# Patient Record
Sex: MALE | Race: OTHER | Employment: FULL TIME | URBAN - METROPOLITAN AREA
[De-identification: names, ages, dates, MRNs, and addresses within clinical notes are randomized per-mention and may not be internally consistent; named-entity substitution may affect disease eponyms.]

---

## 2020-03-31 ENCOUNTER — HOSPITAL ENCOUNTER (EMERGENCY)
Age: 30
Discharge: HOME | End: 2020-03-31
Attending: EMERGENCY MEDICINE
Payer: SELF-PAY

## 2020-03-31 ENCOUNTER — APPOINTMENT (OUTPATIENT)
Dept: GENERAL RADIOLOGY | Age: 30
End: 2020-03-31
Attending: EMERGENCY MEDICINE
Payer: SELF-PAY

## 2020-03-31 VITALS
DIASTOLIC BLOOD PRESSURE: 75 MMHG | OXYGEN SATURATION: 98 % | BODY MASS INDEX: 24.29 KG/M2 | SYSTOLIC BLOOD PRESSURE: 121 MMHG | RESPIRATION RATE: 18 BRPM | WEIGHT: 132 LBS | HEART RATE: 62 BPM | HEIGHT: 62 IN | TEMPERATURE: 98.7 F

## 2020-03-31 DIAGNOSIS — J06.9 UPPER RESPIRATORY TRACT INFECTION, UNSPECIFIED TYPE: Primary | ICD-10-CM

## 2020-03-31 PROCEDURE — 71046 X-RAY EXAM CHEST 2 VIEWS: CPT

## 2020-03-31 PROCEDURE — 99284 EMERGENCY DEPT VISIT MOD MDM: CPT

## 2020-03-31 RX ORDER — BROMPHENIRAMINE MALEATE, PSEUDOEPHEDRINE HYDROCHLORIDE, AND DEXTROMETHORPHAN HYDROBROMIDE 2; 30; 10 MG/5ML; MG/5ML; MG/5ML
5 SYRUP ORAL
Qty: 200 ML | Refills: 0 | Status: SHIPPED | OUTPATIENT
Start: 2020-03-31

## 2020-04-01 ENCOUNTER — PATIENT OUTREACH (OUTPATIENT)
Dept: CASE MANAGEMENT | Age: 30
End: 2020-04-01

## 2020-04-01 NOTE — DISCHARGE INSTRUCTIONS
Patient Education        Infección de las vías respiratorias altas (Bernestine Pean): Instrucciones de cuidado  Upper Respiratory Infection (Cold): Care Instructions  Instrucciones de cuidado    La infección de las vías respiratorias altas (o URI, por omar siglas en inglés), es tamy infección de la [place], los senos paranasales o la garganta. Las URI se transmiten por la tos, los estornudos y el contacto directo. El resfriado común es el tipo más frecuente de URI. La gripe y las infecciones de los senos paranasales son otros tipos de URI. Grace todas las URI son causadas por virus. Los antibióticos no las Nany H[place]. Sin embargo, usted puede tratar la mayoría de estas infecciones con cuidados en el hogar. [place] puede implicar beber muchos líquidos y jessa analgésicos (medicamentos para el dolor) de venta trinh. Es probable que se sienta mejor al cabo de 4 a 10 días. El médico lo modi revisado minuciosamente, marii se pueden presentar problemas más tarde. Si nota algún problema o nuevos síntomas, busque tratamiento médico inmediatamente. La atención de seguimiento es tamy parte clave de mueller tratamiento y seguridad. Asegúrese de hacer y acudir a todas las citas, y llame a mueller médico si está teniendo problemas. También es tamy buena idea saber los resultados de omar exámenes y mantener tamy lista de los medicamentos que sylvain. ¿Cómo puede cuidarse en el hogar? · Para prevenir la deshidratación, ana cristina abundantes líquidos, los suficientes alan para que mueller orina sea de color amarillo silvia o transparente alan el agua. Opte por beber agua y otros líquidos summer sin cafeína hasta que se sienta mejor. Si tiene [facility] [employer], del corazón o del hígado y tiene que [place] líquidos, hable con mueller médico antes de aumentar mueller consumo. · Urich un analgésico de venta trinh, alan acetaminofén (Tylenol), ibuprofeno (Advil, Motrin) o naproxeno (Aleve). Marcelle y siga todas las instrucciones de la [place].   · Antes de usar medicamentos para la tos y los resfriados, revise la [place]. Estos medicamentos podrían no ser seguros para los niños pequeños o las personas con ciertos problemas de [place]. · Tenga cuidado cuando tome medicamentos de venta trinh para el resfriado común o la gripe y Tylenol al [employer]. Muchos de estos medicamentos contienen acetaminofén, o sea, Tylenol. Marcelle las etiquetas para asegurarse de que no está tomando tamy dosis mayor que la recomendada. El exceso de acetaminofén (Tylenol) puede ser dañino. · Descanse lo suficiente. · No fume ni permita que otros fumen cerca de usted. Si necesita ayuda para dejar de fumar, hable con mueller médico acerca de programas y medicamentos para dejar de fumar. Estos pueden aumentar omar probabilidades de dejar el hábito para siempre. ¿Cuándo debe pedir ayuda? Llame al 911 en cualquier momento que considere que necesita atención de [place]. Por ejemplo, llame si:    · Tiene graves dificultades para respirar.    Llame a mueller médico ahora mismo o busque atención médica inmediata si:    · Le parece que está mucho más enfermo.     · Tiene nueva o peor dificultad para respirar.     · Tiene fiebre nueva o más bianka.     · Tiene un salpullido nuevo.    Preste especial atención a los cambios en mueller davin y asegúrese de comunicarse con mueller médico si:    · Tiene síntomas nuevos, alan dolor de garganta, dolor de oídos o dolor de los senos paranasales.     · Mueller tos es más profunda o más frecuente que antes, especialmente si nota más mucosidad o un cambio en el color de la mucosidad.     · No mejora alan se esperaba. ¿Dónde puede encontrar más información en inglés? Vaya a http://florin-viridiana.info/  Kimmie [id number] en la búsqueda para aprender más acerca de \"Infección de las vías respiratorias altas (Tim [place]ible[place]): Instrucciones de cuidado. \"  Revisado: 9 junio, 2019Versión del contenido: 12.4  © 1667-9363 Healthwise, Incorporated.   Las instrucciones de cuidado fueron adaptadas bajo licencia por [facility] (which disclaims liability or warranty for this information). Si usted tiene [place] afección médica o sobre estas instrucciones, siempre pregunte a mueller profesional de davin. [facility] niega toda garantía o responsabilidad por mueller uso de esta información.

## 2020-04-01 NOTE — ED TRIAGE NOTES
Arrives with face mask in place, placed by EMS pta. Reports non-productive cough. Onset last night. Denies fever/chills, shortness of breath, n/v/d, abdominal pain. Denies recent travel or sick exposure. Attempted otc meds without relief.

## 2020-04-01 NOTE — ED PROVIDER NOTES
Arrives with face mask in place, placed by EMS pta. Reports non-productive cough. Onset last night. Denies fever/chills, shortness of breath, n/v/d, abdominal pain. Denies recent travel or sick exposure. Attempted otc meds without relief. The history is provided by the patient. Cough   This is a new problem. The current episode started 12 to 24 hours ago. The problem occurs constantly. The problem has not changed since onset. The cough is non-productive. There has been no fever. Associated symptoms include rhinorrhea and sore throat. Pertinent negatives include no chest pain, no chills, no sweats, no weight loss, no eye redness, no ear congestion, no ear pain, no headaches, no myalgias, no shortness of breath, no wheezing, no nausea, no vomiting and no confusion. The treatment provided no relief. Risk factors include travel to endemic areas. He is not a smoker. No past medical history on file. No past surgical history on file. No family history on file.     Social History     Socioeconomic History    Marital status: SINGLE     Spouse name: Not on file    Number of children: Not on file    Years of education: Not on file    Highest education level: Not on file   Occupational History    Not on file   Social Needs    Financial resource strain: Not on file    Food insecurity     Worry: Not on file     Inability: Not on file    Transportation needs     Medical: Not on file     Non-medical: Not on file   Tobacco Use    Smoking status: Not on file   Substance and Sexual Activity    Alcohol use: Not on file    Drug use: Not on file    Sexual activity: Not on file   Lifestyle    Physical activity     Days per week: Not on file     Minutes per session: Not on file    Stress: Not on file   Relationships    Social connections     Talks on phone: Not on file     Gets together: Not on file     Attends Muslim service: Not on file     Active member of club or organization: Not on file Attends meetings of clubs or organizations: Not on file     Relationship status: Not on file    Intimate partner violence     Fear of current or ex partner: Not on file     Emotionally abused: Not on file     Physically abused: Not on file     Forced sexual activity: Not on file   Other Topics Concern    Not on file   Social History Narrative    Not on file         ALLERGIES: Patient has no known allergies. Review of Systems   Constitutional: Negative for chills and weight loss. HENT: Positive for rhinorrhea and sore throat. Negative for ear pain. Eyes: Negative for redness. Respiratory: Positive for cough. Negative for shortness of breath and wheezing. Cardiovascular: Negative for chest pain. Gastrointestinal: Negative for nausea and vomiting. Musculoskeletal: Negative for myalgias. Neurological: Negative for headaches. Psychiatric/Behavioral: Negative for confusion. All other systems reviewed and are negative. Vitals:    03/31/20 2041   BP: 135/77   Pulse: 68   Resp: 18   Temp: 98.7 °F (37.1 °C)   SpO2: 98%   Weight: 59.9 kg (132 lb)   Height: 5' 2\" (1.575 m)            Physical Exam  Vitals signs and nursing note reviewed. Constitutional:       General: He is not in acute distress. Appearance: Normal appearance. He is not ill-appearing, toxic-appearing or diaphoretic. HENT:      Head: Normocephalic and atraumatic. Right Ear: Tympanic membrane normal.      Left Ear: Tympanic membrane normal.      Nose: Congestion present. No rhinorrhea. Mouth/Throat:      Mouth: Mucous membranes are moist.      Pharynx: Oropharynx is clear. No oropharyngeal exudate or posterior oropharyngeal erythema. Eyes:      Extraocular Movements: Extraocular movements intact. Conjunctiva/sclera: Conjunctivae normal.      Pupils: Pupils are equal, round, and reactive to light. Neck:      Musculoskeletal: Normal range of motion and neck supple.    Cardiovascular:      Rate and Rhythm: Normal rate and regular rhythm. Pulses: Normal pulses. Heart sounds: Normal heart sounds. Pulmonary:      Effort: Pulmonary effort is normal.      Breath sounds: Normal breath sounds. Musculoskeletal: Normal range of motion. Lymphadenopathy:      Cervical: No cervical adenopathy. Skin:     General: Skin is warm and dry. Capillary Refill: Capillary refill takes less than 2 seconds. Neurological:      General: No focal deficit present. Mental Status: He is alert and oriented to person, place, and time. Mental status is at baseline. Psychiatric:         Mood and Affect: Mood normal.         Behavior: Behavior normal.         Thought Content:  Thought content normal.          MDM  Number of Diagnoses or Management Options     Amount and/or Complexity of Data Reviewed  Tests in the radiology section of CPT®: ordered and reviewed    Risk of Complications, Morbidity, and/or Mortality  Presenting problems: low  Diagnostic procedures: low  Management options: low    Patient Progress  Patient progress: stable         Procedures

## 2020-04-01 NOTE — PROGRESS NOTES
Patient contacted regarding recent discharge and COVID-19 risk   Care Transition Nurse/ Ambulatory Care Manager contacted the patient by telephone to perform post discharge assessment. Verified name and  with patient as identifiers. Patient has following risk factors of: None. CTN/ACM reviewed discharge instructions, medical action plan and red flags related to discharge diagnosis. Reviewed and educated them on any new and changed medications related to discharge diagnosis. Advised obtaining a 90-day supply of all daily and as-needed medications. Education provided regarding infection prevention, and signs and symptoms of COVID-19 and when to seek medical attention with patient who verbalized understanding. Discussed exposure protocols and quarantine from [address] you at higher risk for severe illness  and given an opportunity for questions and concerns. The patient agrees to contact the COVID-19 hotline [phone] or PCP office for questions related to their healthcare. CTN/ACM provided contact information for future reference. From CDC: Are you at higher risk for severe illness?  Wash your hands often.  Avoid close contact (6 feet, which is about two arm lengths) with people who are sick.  Put distance between yourself and other people if COVID-19 is spreading in your community.  Clean and disinfect frequently touched surfaces.  Avoid all cruise travel and non-essential air travel.  Call your healthcare professional if you have concerns about COVID-19 and your underlying condition or if you are sick. For more information on steps you can take to protect yourself, see CDC's How to Protect Yourself       line assisted w/ call. Pt is Frisian speaker only. Pt reports his cough and sore throat are better. Denies any other symptoms. Plans to  [facility] RX today. Pt does not have a PCP.  Informed patient of [facility] or [facility] for f/u, go to ED if emergency. Plan to f/u next week, agreeable.

## 2020-04-01 NOTE — ED NOTES
I have reviewed discharge instructions with the patient. The patient verbalized understanding. Patient left ED via Discharge Method: ambulatory to Home with (insert name of family/friend, self, transport family). Opportunity for questions and clarification provided. Patient given 2 scripts. To continue your aftercare when you leave the hospital, you may receive an automated call from our care team to check in on how you are doing.  This is a free service and part of our promise to provide the best care and service to meet your aftercare needs. \" If you have questions, or wish to unsubscribe from this service please call [phone].  Thank you for Choosing our [facility] Emergency Department.

## 2020-04-09 ENCOUNTER — PATIENT OUTREACH (OUTPATIENT)
Dept: CASE MANAGEMENT | Age: 30
End: 2020-04-09

## 2022-02-08 ENCOUNTER — HOSPITAL ENCOUNTER (EMERGENCY)
Age: 32
Discharge: HOME | End: 2022-02-09
Attending: EMERGENCY MEDICINE

## 2022-02-08 VITALS
DIASTOLIC BLOOD PRESSURE: 72 MMHG | RESPIRATION RATE: 16 BRPM | HEART RATE: 59 BPM | SYSTOLIC BLOOD PRESSURE: 111 MMHG | BODY MASS INDEX: 27.6 KG/M2 | WEIGHT: 150 LBS | HEIGHT: 62 IN | TEMPERATURE: 98.4 F | OXYGEN SATURATION: 99 %

## 2022-02-08 DIAGNOSIS — K29.00 ACUTE GASTRITIS WITHOUT HEMORRHAGE, UNSPECIFIED GASTRITIS TYPE: Primary | ICD-10-CM

## 2022-02-08 LAB
ALBUMIN SERPL-MCNC: 4 G/DL (ref 3.5–5)
ALBUMIN/GLOB SERPL: 1.2 {RATIO} (ref 1.2–3.5)
ALP SERPL-CCNC: 85 U/L (ref 50–136)
ALT SERPL-CCNC: 50 U/L (ref 12–65)
ANION GAP SERPL CALC-SCNC: 5 MMOL/L (ref 7–16)
AST SERPL-CCNC: 19 U/L (ref 15–37)
BASOPHILS # BLD: 0 K/UL (ref 0–0.2)
BASOPHILS NFR BLD: 0 % (ref 0–2)
BILIRUB SERPL-MCNC: 0.3 MG/DL (ref 0.2–1.1)
BILIRUB UR QL: NEGATIVE
BUN SERPL-MCNC: 16 MG/DL (ref 6–23)
CALCIUM SERPL-MCNC: 9.1 MG/DL (ref 8.3–10.4)
CHLORIDE SERPL-SCNC: 105 MMOL/L (ref 98–107)
CO2 SERPL-SCNC: 29 MMOL/L (ref 21–32)
CREAT SERPL-MCNC: 0.7 MG/DL (ref 0.8–1.5)
DIFFERENTIAL METHOD BLD: ABNORMAL
EOSINOPHIL # BLD: 0 K/UL (ref 0–0.8)
EOSINOPHIL NFR BLD: 0 % (ref 0.5–7.8)
ERYTHROCYTE [DISTWIDTH] IN BLOOD BY AUTOMATED COUNT: 13.2 % (ref 11.9–14.6)
GLOBULIN SER CALC-MCNC: 3.3 G/DL (ref 2.3–3.5)
GLUCOSE SERPL-MCNC: 109 MG/DL (ref 65–100)
GLUCOSE UR QL STRIP.AUTO: NEGATIVE MG/DL
HCT VFR BLD AUTO: 42.3 % (ref 41.1–50.3)
HGB BLD-MCNC: 14.1 G/DL (ref 13.6–17.2)
IMM GRANULOCYTES # BLD AUTO: 0.2 K/UL (ref 0–0.5)
IMM GRANULOCYTES NFR BLD AUTO: 1 % (ref 0–5)
KETONES UR-MCNC: NEGATIVE MG/DL
LEUKOCYTE ESTERASE UR QL STRIP: NEGATIVE
LIPASE SERPL-CCNC: 70 U/L (ref 73–393)
LYMPHOCYTES # BLD: 3.2 K/UL (ref 0.5–4.6)
LYMPHOCYTES NFR BLD: 24 % (ref 13–44)
MCH RBC QN AUTO: 29.3 PG (ref 26.1–32.9)
MCHC RBC AUTO-ENTMCNC: 33.3 G/DL (ref 31.4–35)
MCV RBC AUTO: 87.8 FL (ref 79.6–97.8)
MONOCYTES # BLD: 0.8 K/UL (ref 0.1–1.3)
MONOCYTES NFR BLD: 6 % (ref 4–12)
NEUTS SEG # BLD: 9.2 K/UL (ref 1.7–8.2)
NEUTS SEG NFR BLD: 69 % (ref 43–78)
NITRITE UR QL: NEGATIVE
NRBC # BLD: 0 K/UL (ref 0–0.2)
PH UR: 7 [PH] (ref 5–9)
PLATELET # BLD AUTO: 307 K/UL (ref 150–450)
PMV BLD AUTO: 10.7 FL (ref 9.4–12.3)
POTASSIUM SERPL-SCNC: 4.1 MMOL/L (ref 3.5–5.1)
PROT SERPL-MCNC: 7.3 G/DL (ref 6.3–8.2)
PROT UR QL: NEGATIVE MG/DL
RBC # BLD AUTO: 4.82 M/UL (ref 4.23–5.6)
RBC # UR STRIP: NEGATIVE /UL
SERVICE CMNT-IMP: ABNORMAL
SODIUM SERPL-SCNC: 139 MMOL/L (ref 136–145)
SP GR UR: >1.03 (ref 1–1.02)
UROBILINOGEN UR QL: 0.2 EU/DL (ref 0.2–1)
WBC # BLD AUTO: 13.4 K/UL (ref 4.3–11.1)

## 2022-02-08 PROCEDURE — 80053 COMPREHEN METABOLIC PANEL: CPT

## 2022-02-08 PROCEDURE — 81003 URINALYSIS AUTO W/O SCOPE: CPT

## 2022-02-08 PROCEDURE — 85025 COMPLETE CBC W/AUTO DIFF WBC: CPT

## 2022-02-08 PROCEDURE — 99284 EMERGENCY DEPT VISIT MOD MDM: CPT

## 2022-02-08 PROCEDURE — 83690 ASSAY OF LIPASE: CPT

## 2022-02-08 RX ORDER — SODIUM CHLORIDE 0.9 % (FLUSH) 0.9 %
5-10 SYRINGE (ML) INJECTION EVERY 8 HOURS
Status: DISCONTINUED | OUTPATIENT
Start: 2022-02-08 | End: 2022-02-09 | Stop reason: HOSPADM

## 2022-02-08 RX ORDER — SODIUM CHLORIDE 0.9 % (FLUSH) 0.9 %
5-10 SYRINGE (ML) INJECTION AS NEEDED
Status: DISCONTINUED | OUTPATIENT
Start: 2022-02-08 | End: 2022-02-09 | Stop reason: HOSPADM

## 2022-02-09 PROCEDURE — 74011000250 HC RX REV CODE- 250: Performed by: EMERGENCY MEDICINE

## 2022-02-09 PROCEDURE — 74011250637 HC RX REV CODE- 250/637

## 2022-02-09 RX ORDER — MAG HYDROX/ALUMINUM HYD/SIMETH 200-200-20
SUSPENSION, ORAL (FINAL DOSE FORM) ORAL
Status: COMPLETED
Start: 2022-02-09 | End: 2022-02-09

## 2022-02-09 RX ORDER — HYDROGEN PEROXIDE 3 %
20 SOLUTION, NON-ORAL MISCELLANEOUS DAILY
Qty: 20 CAPSULE | Refills: 0 | Status: SHIPPED | OUTPATIENT
Start: 2022-02-09 | End: 2022-03-01

## 2022-02-09 RX ORDER — MAG HYDROX/ALUMINUM HYD/SIMETH 200-200-20
30 SUSPENSION, ORAL (FINAL DOSE FORM) ORAL
Status: COMPLETED | OUTPATIENT
Start: 2022-02-09 | End: 2022-02-09

## 2022-02-09 RX ORDER — LIDOCAINE HYDROCHLORIDE 20 MG/ML
10 SOLUTION OROPHARYNGEAL ONCE
Status: COMPLETED | OUTPATIENT
Start: 2022-02-09 | End: 2022-02-09

## 2022-02-09 RX ADMIN — ALUMINUM HYDROXIDE, MAGNESIUM HYDROXIDE, DIMETHICONE 30 ML: 200; 200; 20 LIQUID ORAL at 02:30

## 2022-02-09 RX ADMIN — Medication 30 ML: at 02:30

## 2022-02-09 RX ADMIN — LIDOCAINE HYDROCHLORIDE 10 ML: 20 SOLUTION ORAL; TOPICAL at 02:25

## 2022-02-09 NOTE — ED TRIAGE NOTES
Pt states that he began having abd pain 2 weeks ago. States this morning the pain got a lot worse and he also began having back pain.

## 2022-02-09 NOTE — ED NOTES
I have reviewed discharge instructions with the patient. The patient verbalized understanding. Patient left ED via Discharge Method: ambulatory to Home with female . Opportunity for questions and clarification provided. Patient given 1 scripts. To continue your aftercare when you leave the hospital, you may receive an automated call from our care team to check in on how you are doing. This is a free service and part of our promise to provide the best care and service to meet your aftercare needs.  If you have questions, or wish to unsubscribe from this service please call [phone]. Thank you for Choosing our [facility] Emergency Department.

## 2022-02-09 NOTE — ED PROVIDER NOTES
57-year-old male presents with 2 to 3 weeks of upper abdominal cramping rating to his chest and back. He states he gets it about 2-3 times a day. Pain is worse when he does not eat. He reports one episode of vomiting yesterday and then ate an orange because he was hungry. He denies diarrhea. No blood in vomit or stools. No fever. No prior abdominal surgeries. Denies alcohol use. He stopped eating spicy foods 2 weeks ago when pain started. Abdominal Pain   Associated symptoms include nausea, vomiting and back pain. Pertinent negatives include no fever, no diarrhea, no headaches and no chest pain. Back Pain   Associated symptoms include abdominal pain. Pertinent negatives include no chest pain, no fever and no headaches. No past medical history on file. No past surgical history on file. No family history on file. Social History     Socioeconomic History    Marital status: SINGLE     Spouse name: Not on file    Number of children: Not on file    Years of education: Not on file    Highest education level: Not on file   Occupational History    Not on file   Tobacco Use    Smoking status: Not on file    Smokeless tobacco: Not on file   Substance and Sexual Activity    Alcohol use: Not on file    Drug use: Not on file    Sexual activity: Not on file   Other Topics Concern    Not on file   Social History Narrative    Not on file     Social Determinants of Health     Financial Resource Strain:     Difficulty of Paying Living Expenses: Not on file   Food Insecurity:     Worried About Running Out of Food in the Last Year: Not on file    Marjorie of Food in the Last Year: Not on file   Transportation Needs:     Lack of Transportation (Medical): Not on file    Lack of Transportation (Non-Medical):  Not on file   Physical Activity:     Days of Exercise per Week: Not on file    Minutes of Exercise per Session: Not on file   Stress:     Feeling of Stress : Not on file   Social Connections:     Frequency of Communication with Friends and Family: Not on file    Frequency of Social Gatherings with Friends and Family: Not on file    Attends [employer] Services: Not on file    Active Member of Clubs or Organizations: Not on file    Attends Club or Organization Meetings: Not on file    Marital Status: Not on file   Intimate Partner Violence:     Fear of Current or Ex-Partner: Not on file    Emotionally Abused: Not on file    Physically Abused: Not on file    Sexually Abused: Not on file   Housing Stability:     Unable to Pay for Housing in the Last Year: Not on file    Number of [place] in the Last Year: Not on file    Unstable Housing in the Last Year: Not on file         ALLERGIES: Patient has no known allergies. Review of Systems   Constitutional: Negative for fever. HENT: Negative for hearing loss. Eyes: Negative for visual disturbance. Respiratory: Negative for cough and shortness of breath. Cardiovascular: Negative for chest pain. Gastrointestinal: Positive for abdominal pain, nausea and vomiting. Negative for diarrhea. Musculoskeletal: Positive for back pain. Skin: Negative for rash. Neurological: Negative for headaches. Psychiatric/Behavioral: Negative for confusion. All other systems reviewed and are negative. Vitals:    02/08/22 2019 02/08/22 2237   BP: 121/69 111/72   Pulse: 62 (!) 59   Resp: 16    Temp: 98.4 °F (36.9 °C)    SpO2: 97% 99%   Weight: 68 kg (150 lb)    Height: 5' 2\" (1.575 m)             Physical Exam  Vitals and nursing note reviewed. Constitutional:       Appearance: Normal appearance. He is well-developed. HENT:      Head: Normocephalic and atraumatic. Nose: Nose normal.      Mouth/Throat:      Mouth: Mucous membranes are moist.   Eyes:      Pupils: Pupils are equal, round, and reactive to light. Cardiovascular:      Rate and Rhythm: Regular rhythm. Heart sounds: Normal heart sounds.    Pulmonary: Effort: Pulmonary effort is normal.      Breath sounds: Normal breath sounds. Abdominal:      Palpations: Abdomen is soft. Tenderness: There is abdominal tenderness in the epigastric area. Musculoskeletal:         General: No deformity. Normal range of motion. Cervical back: Normal range of motion and neck supple. Skin:     General: Skin is warm and dry. Neurological:      General: No focal deficit present. Mental Status: He is alert. Mental status is at baseline. Psychiatric:         Mood and Affect: Mood normal.         Behavior: Behavior normal.          MDM  Number of Diagnoses or Management Options  Acute gastritis without hemorrhage, unspecified gastritis type  Diagnosis management comments: Parts of this document were created using dragon voice recognition software. The chart has been reviewed but errors may still be present. I wore appropriate PPE throughout this patient's ED visit. Michael Braun MD, 2:31 AM    Well-appearing. Likely gastritis/peptic ulcer disease/GERD. Normal LFTs and lipase. Will prescribe PPI. Advise primary care follow-up after 1 week. I discussed the results of all labs, procedures, radiographs, and treatments with the patient and available family. Treatment plan is agreed upon and the patient is ready for discharge. Questions about treatment in the ED and differential diagnosis of presenting condition were answered. Patient was given verbal discharge instructions including, but not limited to, importance of returning to the emergency department for any concern of worsening or continued symptoms. Instructions were given to follow up with a primary care provider or specialist within 1-2 days. Adverse effects of medications, if prescribed, were discussed and patient was advised to refrain from significant physical activity until followed up by primary care physician and to not drive or operate heavy machinery after taking any sedating substances. Amount and/or Complexity of Data Reviewed  Clinical lab tests: reviewed and ordered (Results for orders placed or performed during the hospital encounter of 02/08/22  -CBC WITH AUTOMATED DIFF:        Result                      Value             Ref Range           WBC                         13.4 (H)          4.3 - 11.1 K*       RBC                         4.82              4.23 - 5.6 M*       HGB                         14.1              13.6 - 17.2 *       HCT                         42.3              41.1 - 50.3 %       MCV                         87.8              79.6 - 97.8 *       MCH                         29.3              26.1 - 32.9 *       MCHC                        33.3              31.4 - 35.0 *       RDW                         13.2              11.9 - 14.6 %       PLATELET                    307               150 - 450 K/*       MPV                         10.7              9.4 - 12.3 FL       ABSOLUTE NRBC               0.00              0.0 - 0.2 K/*       DF                          AUTOMATED                             NEUTROPHILS                 69                43 - 78 %           LYMPHOCYTES                 24                13 - 44 %           MONOCYTES                   6                 4.0 - 12.0 %        EOSINOPHILS                 0 (L)             0.5 - 7.8 %         BASOPHILS                   0                 0.0 - 2.0 %         IMMATURE GRANULOCYTES       1                 0.0 - 5.0 %         ABS. NEUTROPHILS            9.2 (H)           1.7 - 8.2 K/*       ABS. LYMPHOCYTES            3.2               0.5 - 4.6 K/*       ABS. MONOCYTES              0.8               0.1 - 1.3 K/*       ABS. EOSINOPHILS            0.0               0.0 - 0.8 K/*       ABS. BASOPHILS              0.0               0.0 - 0.2 K/*       ABS. IMM.  GRANS.            0.2               0.0 - 0.5 K/*  -METABOLIC PANEL, COMPREHENSIVE:        Result                      Value             Ref Range Sodium                      139               136 - 145 mm*       Potassium                   4.1               3.5 - 5.1 mm*       Chloride                    105               98 - 107 mmo*       CO2                         29                21 - 32 mmol*       Anion gap                   5 (L)             7 - 16 mmol/L       Glucose                     109 (H)           65 - 100 mg/*       BUN                         16                6 - 23 MG/DL        Creatinine                  0.70 (L)          0.8 - 1.5 MG*       GFR est AA                  >60               >60 ml/min/1*       GFR est non-AA              >60               >60 ml/min/1*       Calcium                     9.1               8.3 - 10.4 M*       Bilirubin, total            0.3               0.2 - 1.1 MG*       ALT (SGPT)                  50                12 - 65 U/L         AST (SGOT)                  19                15 - 37 U/L         Alk.  phosphatase            85                50 - 136 U/L        Protein, total              7.3               6.3 - 8.2 g/*       Albumin                     4.0               3.5 - 5.0 g/*       Globulin                    3.3               2.3 - 3.5 g/*       A-G Ratio                   1.2               1.2 - 3.5      -LIPASE:        Result                      Value             Ref Range           Lipase                      70 (L)            73 - 393 U/L   -POC URINE MACROSCOPIC:        Result                      Value             Ref Range           Spec. gravity (POC)         >1.030 (H)        1.001 - 1.023       pH, urine  (POC)            7.0               5.0 - 9.0           Protein (POC)               Negative          NEG mg/dL           Glucose, urine (POC)        Negative          NEG mg/dL           Ketones (POC)               Negative          NEG mg/dL           Bilirubin (POC)             Negative          NEG                 Blood (POC)                 Negative          NEG Urobilinogen (POC)          0.2               0.2 - 1.0 EU*       Nitrite (POC)               Negative          NEG                 Leukocyte esterase (PO*     Negative          NEG                 Performed by                Bennie Humphrey                    )  Tests in the medicine section of CPT®: ordered and reviewed           Procedures